# Patient Record
Sex: FEMALE
[De-identification: names, ages, dates, MRNs, and addresses within clinical notes are randomized per-mention and may not be internally consistent; named-entity substitution may affect disease eponyms.]

---

## 2022-09-20 ENCOUNTER — APPOINTMENT (OUTPATIENT)
Dept: ORTHOPEDIC SURGERY | Facility: CLINIC | Age: 72
End: 2022-09-20

## 2022-09-20 VITALS — BODY MASS INDEX: 23.79 KG/M2 | WEIGHT: 126 LBS | HEIGHT: 61 IN

## 2022-09-20 DIAGNOSIS — M16.12 UNILATERAL PRIMARY OSTEOARTHRITIS, LEFT HIP: ICD-10-CM

## 2022-09-20 DIAGNOSIS — M51.16 INTERVERTEBRAL DISC DISORDERS WITH RADICULOPATHY, LUMBAR REGION: ICD-10-CM

## 2022-09-20 PROCEDURE — 99214 OFFICE O/P EST MOD 30 MIN: CPT

## 2022-09-20 PROCEDURE — 73502 X-RAY EXAM HIP UNI 2-3 VIEWS: CPT | Mod: LT

## 2022-09-20 PROCEDURE — 72110 X-RAY EXAM L-2 SPINE 4/>VWS: CPT

## 2022-09-20 RX ORDER — MELOXICAM 15 MG/1
15 TABLET ORAL
Qty: 30 | Refills: 1 | Status: ACTIVE | COMMUNITY
Start: 2022-09-20 | End: 1900-01-01

## 2022-09-20 NOTE — IMAGING
[Straightening consistent with spasm] : Straightening consistent with spasm [Severe arthritis (Tonnis Grade 3)] : Severe arthritis (Tonnis Grade 3) [FreeTextEntry1] : narrowing of 5-1 and also 1-2 with posterior facet arthropathy and anterior osteophyte formation noted [FreeTextEntry9] : narrowing worse inferior with large osteophyte formation noted

## 2022-09-20 NOTE — HISTORY OF PRESENT ILLNESS
[Lower back] : lower back [Sudden] : sudden [7] : 7 [0] : 0 [Dull/Aching] : dull/aching [Radiating] : radiating [Shooting] : shooting [Constant] : constant [Rest] : rest [Walking] : walking [Retired] : Work status: retired [de-identified] : 9-20-22- She states 2+ month history of left sided lower back pain. She feels the symptoms came on over the summer after pulling weeds while gardening. she notes the pain seems to radiate into the left groin and down the anterior aspect of her leg to the level of the knee and sometimes beyond. symptoms seems to be worse with prolonged standing and sitting. Do not wake her at night. she has tried some otc nsaids with some short term help\par \par PMH HTN and hypothyroid\par \par retired\par \par also prior right total hip replacement  [] : no [FreeTextEntry5] : pt states she started having pain on her lower back radiating to her left leg  [FreeTextEntry7] : left leg  [de-identified] : activity

## 2022-09-20 NOTE — ASSESSMENT
[FreeTextEntry1] : she does have findings of both her lower back and left hip. I however feel that the hip is more of her issue. will start her on therapy and mobic for both and have her consult with joint replacement specialist Dr Austin or tahmina for consult

## 2022-11-03 ENCOUNTER — APPOINTMENT (OUTPATIENT)
Dept: ORTHOPEDIC SURGERY | Facility: CLINIC | Age: 72
End: 2022-11-03

## 2024-06-25 ENCOUNTER — APPOINTMENT (OUTPATIENT)
Dept: ORTHOPEDIC SURGERY | Facility: CLINIC | Age: 74
End: 2024-06-25

## 2024-08-12 ENCOUNTER — APPOINTMENT (OUTPATIENT)
Dept: ORTHOPEDIC SURGERY | Facility: CLINIC | Age: 74
End: 2024-08-12
Payer: MEDICARE

## 2024-08-12 DIAGNOSIS — Z78.9 OTHER SPECIFIED HEALTH STATUS: ICD-10-CM

## 2024-08-12 DIAGNOSIS — M51.9 UNSPECIFIED THORACIC, THORACOLUMBAR AND LUMBOSACRAL INTERVERTEBRAL DISC DISORDER: ICD-10-CM

## 2024-08-12 PROCEDURE — 72170 X-RAY EXAM OF PELVIS: CPT

## 2024-08-12 PROCEDURE — 72110 X-RAY EXAM L-2 SPINE 4/>VWS: CPT

## 2024-08-12 PROCEDURE — 99213 OFFICE O/P EST LOW 20 MIN: CPT

## 2024-08-12 NOTE — ASSESSMENT
[FreeTextEntry1] : 75yo F with lumbar DDD and s/p b/l CHAYITO, no radicular symptoms on exam today  -will start in formal PT for her back  -continue with naproxen as needed  -RTC 6 weeks, consider MRI if no improvement

## 2024-08-12 NOTE — IMAGING
[Disc space narrowing] : Disc space narrowing [Bilateral] : hip bilaterally [AP] : anteroposterior [Components well fixed, in good position] : Components well fixed, in good position [de-identified] : LSPINE Inspection: no defects, deformity Palpation: Mild tenderness bilateral lumbar paraspinal musculature ROM: Full with stiffness/diminished all planes Motor: no focal deficit  Strength: 5/5 bilateral hip flexors, knee extensors, ankle dorsiflexors, EHL, ankle plantarflexors Sensation I LT  - SLR B/L  Toe and heal walking intact  Gait non antalgic, non myelopathic  Bilateral Hip Exam:  No swelling, no ecchymosis, no deformity. No buttock, groin or greater trochanter tenderness to palpation Limited ER/IR  5/5 hip flexion, extension abduction and adduction Negative impingement; no groin pain with hip rotation Motor and sensory intact distally +2 DP and PT pulses Non- antalgic gait

## 2024-08-12 NOTE — HISTORY OF PRESENT ILLNESS
[Lower back] : lower back [Gradual] : gradual [Dull/Aching] : dull/aching [Radiating] : radiating [Constant] : constant [Nothing helps with pain getting better] : Nothing helps with pain getting better [Walking] : walking [de-identified] : 8/12/24: 73yo F presenting for low back pain with associated radicular symptoms. Pain started after L CHAYITO in August 2023. Worse with walking long distances. Has tried PT over the last few weeks with no improvement. Has been taking naproxen with some relief. Denies b/b incontinence. Also s/p R CHAYITO in 2014.  PMHX: HTN, Hypothyroidism  [] : no [FreeTextEntry5] : 73 yo F presenting with low back pain that radiates down b/l legs. Started after having left hip replaced in 2023. Prev seen in 2022. Referred by hip surgeon.  [FreeTextEntry7] : left leg

## 2024-09-24 ENCOUNTER — APPOINTMENT (OUTPATIENT)
Dept: ORTHOPEDIC SURGERY | Facility: CLINIC | Age: 74
End: 2024-09-24

## 2024-11-26 ENCOUNTER — APPOINTMENT (OUTPATIENT)
Dept: ORTHOPEDIC SURGERY | Facility: CLINIC | Age: 74
End: 2024-11-26

## 2024-11-26 VITALS — BODY MASS INDEX: 23.79 KG/M2 | HEIGHT: 61 IN | WEIGHT: 126 LBS

## 2025-05-12 ENCOUNTER — APPOINTMENT (OUTPATIENT)
Dept: ORTHOPEDIC SURGERY | Facility: CLINIC | Age: 75
End: 2025-05-12
Payer: MEDICARE

## 2025-05-12 VITALS — HEIGHT: 61 IN | WEIGHT: 126 LBS | BODY MASS INDEX: 23.79 KG/M2

## 2025-05-12 DIAGNOSIS — M51.16 INTERVERTEBRAL DISC DISORDERS WITH RADICULOPATHY, LUMBAR REGION: ICD-10-CM

## 2025-05-12 PROCEDURE — 99213 OFFICE O/P EST LOW 20 MIN: CPT

## 2025-07-08 VITALS — BODY MASS INDEX: 23.79 KG/M2 | WEIGHT: 126 LBS | HEIGHT: 61 IN

## 2025-07-15 ENCOUNTER — RX RENEWAL (OUTPATIENT)
Age: 75
End: 2025-07-15

## 2025-08-11 VITALS — BODY MASS INDEX: 23.79 KG/M2 | HEIGHT: 61 IN | WEIGHT: 126 LBS

## 2025-08-19 ENCOUNTER — APPOINTMENT (OUTPATIENT)
Dept: ORTHOPEDIC SURGERY | Facility: CLINIC | Age: 75
End: 2025-08-19
Payer: MEDICARE

## 2025-08-19 VITALS — WEIGHT: 126 LBS | HEIGHT: 61 IN | BODY MASS INDEX: 23.79 KG/M2

## 2025-08-19 DIAGNOSIS — M51.16 INTERVERTEBRAL DISC DISORDERS WITH RADICULOPATHY, LUMBAR REGION: ICD-10-CM

## 2025-08-19 PROCEDURE — 99213 OFFICE O/P EST LOW 20 MIN: CPT
